# Patient Record
Sex: FEMALE | Race: OTHER
[De-identification: names, ages, dates, MRNs, and addresses within clinical notes are randomized per-mention and may not be internally consistent; named-entity substitution may affect disease eponyms.]

---

## 2019-01-05 ENCOUNTER — HOSPITAL ENCOUNTER (EMERGENCY)
Dept: HOSPITAL 74 - JER | Age: 59
Discharge: HOME | End: 2019-01-05
Payer: COMMERCIAL

## 2019-01-05 VITALS — BODY MASS INDEX: 25.8 KG/M2

## 2019-01-05 VITALS — TEMPERATURE: 98.3 F | HEART RATE: 72 BPM | DIASTOLIC BLOOD PRESSURE: 77 MMHG | SYSTOLIC BLOOD PRESSURE: 136 MMHG

## 2019-01-05 DIAGNOSIS — R14.1: ICD-10-CM

## 2019-01-05 DIAGNOSIS — R10.9: Primary | ICD-10-CM

## 2019-01-05 DIAGNOSIS — E78.00: ICD-10-CM

## 2019-01-05 DIAGNOSIS — I10: ICD-10-CM

## 2019-01-05 LAB
ALBUMIN SERPL-MCNC: 4 G/DL (ref 3.4–5)
ALP SERPL-CCNC: 84 U/L (ref 45–117)
ALT SERPL-CCNC: 41 U/L (ref 13–61)
AMYLASE SERPL-CCNC: 95 U/L (ref 25–115)
ANION GAP SERPL CALC-SCNC: 5 MMOL/L (ref 8–16)
APPEARANCE UR: CLEAR
AST SERPL-CCNC: 26 U/L (ref 15–37)
BASOPHILS # BLD: 0.5 % (ref 0–2)
BILIRUB SERPL-MCNC: 0.5 MG/DL (ref 0.2–1)
BILIRUB UR STRIP.AUTO-MCNC: NEGATIVE MG/DL
BUN SERPL-MCNC: 12 MG/DL (ref 7–18)
CALCIUM SERPL-MCNC: 8.9 MG/DL (ref 8.5–10.1)
CHLORIDE SERPL-SCNC: 104 MMOL/L (ref 98–107)
CO2 SERPL-SCNC: 28 MMOL/L (ref 21–32)
COLOR UR: (no result)
CREAT SERPL-MCNC: 0.7 MG/DL (ref 0.55–1.3)
DEPRECATED RDW RBC AUTO: 12.2 % (ref 11.6–15.6)
EOSINOPHIL # BLD: 1.4 % (ref 0–4.5)
GLUCOSE SERPL-MCNC: 101 MG/DL (ref 74–106)
HCT VFR BLD CALC: 43.5 % (ref 32.4–45.2)
HGB BLD-MCNC: 14 GM/DL (ref 10.7–15.3)
INR BLD: 0.99 (ref 0.83–1.09)
KETONES UR QL STRIP: NEGATIVE
LEUKOCYTE ESTERASE UR QL STRIP.AUTO: NEGATIVE
LIPASE SERPL-CCNC: 220 U/L (ref 73–393)
LYMPHOCYTES # BLD: 18.1 % (ref 8–40)
MCH RBC QN AUTO: 28.4 PG (ref 25.7–33.7)
MCHC RBC AUTO-ENTMCNC: 32.2 G/DL (ref 32–36)
MCV RBC: 88.1 FL (ref 80–96)
MONOCYTES # BLD AUTO: 7.3 % (ref 3.8–10.2)
NEUTROPHILS # BLD: 72.7 % (ref 42.8–82.8)
NITRITE UR QL STRIP: NEGATIVE
PH UR: 6 [PH] (ref 5–8)
PLATELET # BLD AUTO: 194 K/MM3 (ref 134–434)
PMV BLD: 8.6 FL (ref 7.5–11.1)
POTASSIUM SERPLBLD-SCNC: 4.6 MMOL/L (ref 3.5–5.1)
PROT SERPL-MCNC: 7.7 G/DL (ref 6.4–8.2)
PROT UR QL STRIP: NEGATIVE
PROT UR QL STRIP: NEGATIVE
PT PNL PPP: 11.7 SEC (ref 9.7–13)
RBC # BLD AUTO: 4.93 M/MM3 (ref 3.6–5.2)
SODIUM SERPL-SCNC: 137 MMOL/L (ref 136–145)
SP GR UR: 1.01 (ref 1.01–1.03)
UROBILINOGEN UR STRIP-MCNC: NEGATIVE MG/DL (ref 0.2–1)
WBC # BLD AUTO: 6.8 K/MM3 (ref 4–10)

## 2019-01-05 PROCEDURE — 3E0337Z INTRODUCTION OF ELECTROLYTIC AND WATER BALANCE SUBSTANCE INTO PERIPHERAL VEIN, PERCUTANEOUS APPROACH: ICD-10-PCS

## 2019-01-05 RX ADMIN — SODIUM CHLORIDE STA: 9 INJECTION, SOLUTION INTRAVENOUS at 10:50

## 2019-01-05 RX ADMIN — SODIUM CHLORIDE STA MLS/HR: 9 INJECTION, SOLUTION INTRAVENOUS at 10:48

## 2019-01-05 NOTE — PDOC
History of Present Illness





- General


Chief Complaint: Pain, Acute


Stated Complaint: ABD PAIN


Time Seen by Provider: 01/05/19 10:28


History Source: Patient


Exam Limitations: No Limitations





- History of Present Illness


Travel History: No


Initial Comments: 





01/05/19 10:44





Ms Marti is a 59 yo F with a h/o HTN, HLD


She presents to the ER with a complaint of abdominal gas pain


Pt reports she has had these symptoms since last night at approximately 8pm 

after eating grapes, banana and apple


No fevers or chills


No vomiting


No diarrhea


No ill contacts


No recent travel


No undercooked meat, shellfish











PMH:  HTN, HLD


PSH: myomectomy


Meds:


ALL: NKDA


Social: denies alcohol, drug, cigarette use








ROS:


GENERAL/CONSTITUTIONAL: No: fever, chills, weakness, loss of appetite.


HEAD, EYES, EARS, NOSE AND THROAT: No: change in vision, ear pain, discharge, 

sore throat, throat swelling.


CARDIOVASCULAR: No: chest pain, lightheadedness, palpitations, syncope


RESPIRATORY: No: cough, shortness of breath, wheezing, hemoptysis, stridor.


GASTROINTESTINAL: Yes: abdominal pain No: nausea, vomiting, diarrhea, abdominal 

cramping


GENITOURINARY: No: dysuria, hematuria, frequency, urgency, flank pain.


MUSCULOSKELETAL: No: back pain, neck pain, joint pain, muscle swelling or pain


SKIN: No: lesions, pallor, rash or easy bruising.


NEUROLOGIC: No: headache, vertigo, paresthesias, weakness 


ENDOCRINE: No: unexplained weight gain or loss


HEMATOLOGIC/LYMPHATIC: No: anemia, easy bleeding, swelling nodes.








PE:


GENERAL: The patient is in no acute distress, Awake and alert, Answering 

questions appropriately.


HEAD: Normal 


EYES: PERRLA, EOMI, sclera anicteric, conjunctiva clear.


ENT: Ears normal, nares patent, oropharynx clear without exudates.  Moist 

mucous membranes.


NECK: Normal range of motion, supple, JVD, or masses.


LUNGS: Breath sounds equal, clear to auscultation bilaterally.  No wheezes, and 

no crackles.


HEART:Regular rate and rhythm, normal S1 and S2 without murmur, rub or gallop.


ABDOMEN: Soft, nontender, normoactive bowel sounds.  No guarding, no rebound.   


EXTREMITIES: Normal range of motion, no edema.  No clubbing or cyanosis. No 

erythema, or tenderness.


NEUROLOGICAL: Cranial nerves II through XII grossly intact.  Normal speech.  No 

focal neurological deficits. 


MUSCULOSKELETAL:  Back non-tender to palpation 


SKIN: Warm, Dry, normal turgor, no rashes or lesions noted.





Past History





- Past Medical History


Allergies/Adverse Reactions: 


 Allergies











Allergy/AdvReac Type Severity Reaction Status Date / Time


 


No Known Allergies Allergy   Verified 01/05/19 09:43











Home Medications: 


Ambulatory Orders





Amlodipine Besylate [Norvasc -] 5 mg PO DAILY 01/05/19 


Clopidogrel Bisulfate [Plavix] 75 mg PO DAILY 01/05/19 


Dicyclomine HCl [Bentyl -] 10 mg PO BID PRN #10 capsule 01/05/19 


Simethicone [Gas Relief] 80 mg PO BID PRN #20 tab.chew 01/05/19 


Simvastatin [Zocor] 10 mg PO HS 01/05/19 








CVA: Yes


COPD: No


Diabetes: Yes





- Immunization History


Immunization Up to Date: Yes





- Suicide/Smoking/Psychosocial Hx


Smoking History: Never smoked


Hx Alcohol Use: No


Drug/Substance Use Hx: No





*Physical Exam





- Vital Signs


 Last Vital Signs











Temp Pulse Resp BP Pulse Ox


 


 98.2 F   83   18   134/78   97 


 


 01/05/19 09:43  01/05/19 09:43  01/05/19 09:43  01/05/19 09:43  01/05/19 09:43














Moderate Sedation





- Procedure Monitoring


Vital Signs: 


Procedure Monitoring Vital Signs











Temperature  98.2 F   01/05/19 09:43


 


Pulse Rate  83   01/05/19 09:43


 


Respiratory Rate  18   01/05/19 09:43


 


Blood Pressure  134/78   01/05/19 09:43


 


O2 Sat by Pulse Oximetry (%)  97   01/05/19 09:43











ED Treatment Course





- LABORATORY


CBC & Chemistry Diagram: 


 01/05/19 10:30





 01/05/19 10:30





Medical Decision Making





- Medical Decision Making





01/05/19 12:20





Twelve-lead EKG was performed and reviewed by me. There is normal sinus rhythm 

with a normal rate of 67 bpm. The axis is normal. The intervals are normal. 

There are no ST or T wave abnormalities.





Impression: Normal twelve-lead EKG








 Laboratory Tests











  01/05/19 01/05/19 01/05/19





  10:30 10:30 10:30


 


WBC  6.8  


 


Hgb  14.0  


 


Hct  43.5  


 


Plt Count  194  


 


Absolute Neuts (auto)  4.9  


 


Neutrophils %  72.7  


 


Lymphocytes %  18.1  


 


BUN    12


 


Creatinine    0.7


 


Creatine Kinase    150


 


Creatine Kinase Index    0.6


 


CK-MB (CK-2)    < 1.0


 


Troponin I    < 0.02


 


Lipase    220


 


Urine Blood   Negative 


 


Urine Nitrite   Negative 


 


Ur Leukocyte Esterase   Negative 











Labs WNL


Xray demonstrates no air fluid levels


Will plan to discharge to home


Pt encouraged to have simethicone/bentyl if needed for gas pain


Will as pt to return to the ER for any pain/tenderness, fever, vomiting, 

intractable nausea





01/05/19 13:45


Xray read as several air fluid levels that could be consistent with early 

partial SBO


Pt states she has no abdominal pain


She has been passing gas


No nausea, no vomiting


No abdominal distention


Pt clinically does not seem to be SBO





Pt initially unwilling to have CT done


She requests something to eat


She is tolerating po








Pt counselled to come back to the ER for abdominal pain, nausea,  vomiting, 

inability to tolerate foods or liquids ,fevers


If this is the case, she will need to have CT 





clinical impression: abdominal pain, initial presentation


01/05/19 15:52








*DC/Admit/Observation/Transfer


Diagnosis at time of Disposition: 


Abdominal pain


Qualifiers:


 Abdominal location: unspecified location Qualified Code(s): R10.9 - 

Unspecified abdominal pain








- Discharge Dispostion


Disposition: HOME


Condition at time of disposition: Stable


Decision to Admit order: No





- Prescriptions


Prescriptions: 


Dicyclomine HCl [Bentyl -] 10 mg PO BID PRN #10 capsule


 PRN Reason: abdominal pain


Simethicone [Gas Relief] 80 mg PO BID PRN #20 tab.chew


 PRN Reason: gas pain





- Referrals


Referrals: 


Eugenia Michelle MD [Primary Care Provider] - 





- Patient Instructions


Printed Discharge Instructions:  Avoiding Gas-producing Foods, DI for Abdominal 

Pain-Adult


Additional Instructions: 


Ms Figueroa Castellon





Thank you for coming in to the ER today


Please be sure to follow up with your primary care physician with in 1 wee


You can drink teas and soups to help with your gas


If that doesn't help, please take the medication prescribed


Please return to the ER for fevers, chills, nausea, vomiting, in ability to 

tolerate foods and liquids








- Post Discharge Activity

## 2019-01-06 NOTE — EKG
Test Reason : 

Blood Pressure : ***/*** mmHG

Vent. Rate : 067 BPM     Atrial Rate : 067 BPM

   P-R Int : 136 ms          QRS Dur : 086 ms

    QT Int : 404 ms       P-R-T Axes : 035 022 017 degrees

   QTc Int : 426 ms

 

NORMAL SINUS RHYTHM

NORMAL ECG

NO PREVIOUS ECGS AVAILABLE

Confirmed by MD ALEXIA, KEY ([id number]) on 1/6/2019 4:53:15 PM

 

Referred By:             Confirmed By:KEY HALE MD